# Patient Record
Sex: FEMALE | Race: WHITE | Employment: OTHER | URBAN - METROPOLITAN AREA
[De-identification: names, ages, dates, MRNs, and addresses within clinical notes are randomized per-mention and may not be internally consistent; named-entity substitution may affect disease eponyms.]

---

## 2017-04-03 ENCOUNTER — HOSPITAL ENCOUNTER (OUTPATIENT)
Dept: LAB | Age: 77
Discharge: HOME | End: 2017-04-03
Attending: INTERNAL MEDICINE
Payer: MEDICARE

## 2017-04-03 DIAGNOSIS — E78.5 DYSLIPIDEMIA: Chronic | ICD-10-CM

## 2017-04-03 LAB
AST SERPL W P-5'-P-CCNC: 20 U/L (ref 15–37)
CHOLEST SERPL-MCNC: 184 MG/DL
HDLC SERPL-MCNC: 64 MG/DL (ref 40–60)
HDLC SERPL: 2.9 {RATIO}
LDLC SERPL CALC-MCNC: 84.2 MG/DL
LIPID PROFILE,FLP: ABNORMAL
TRIGL SERPL-MCNC: 179 MG/DL (ref 35–150)
VLDLC SERPL CALC-MCNC: 35.8 MG/DL

## 2017-04-03 PROCEDURE — 84450 TRANSFERASE (AST) (SGOT): CPT | Performed by: INTERNAL MEDICINE

## 2017-04-03 PROCEDURE — 80061 LIPID PANEL: CPT | Performed by: INTERNAL MEDICINE

## 2017-04-03 PROCEDURE — 36415 COLL VENOUS BLD VENIPUNCTURE: CPT | Performed by: INTERNAL MEDICINE

## 2017-12-11 ENCOUNTER — APPOINTMENT (RX ONLY)
Dept: URBAN - METROPOLITAN AREA CLINIC 349 | Facility: CLINIC | Age: 77
Setting detail: DERMATOLOGY
End: 2017-12-11

## 2017-12-11 DIAGNOSIS — Z71.89 OTHER SPECIFIED COUNSELING: ICD-10-CM

## 2017-12-11 DIAGNOSIS — L20.89 OTHER ATOPIC DERMATITIS: ICD-10-CM | Status: STABLE

## 2017-12-11 DIAGNOSIS — L82.1 OTHER SEBORRHEIC KERATOSIS: ICD-10-CM | Status: STABLE

## 2017-12-11 PROBLEM — L20.84 INTRINSIC (ALLERGIC) ECZEMA: Status: ACTIVE | Noted: 2017-12-11

## 2017-12-11 PROBLEM — L85.3 XEROSIS CUTIS: Status: ACTIVE | Noted: 2017-12-11

## 2017-12-11 PROCEDURE — 99213 OFFICE O/P EST LOW 20 MIN: CPT

## 2017-12-11 PROCEDURE — ? TREATMENT REGIMEN

## 2017-12-11 PROCEDURE — ? PRESCRIPTION

## 2017-12-11 PROCEDURE — ? COUNSELING

## 2017-12-11 RX ORDER — TRIAMCINOLONE ACETONIDE 1 MG/G
CREAM TOPICAL
Qty: 1 | Refills: 3 | Status: ERX

## 2017-12-11 ASSESSMENT — LOCATION SIMPLE DESCRIPTION DERM
LOCATION SIMPLE: CHEST
LOCATION SIMPLE: LEFT FOREHEAD
LOCATION SIMPLE: ABDOMEN
LOCATION SIMPLE: RIGHT UPPER BACK
LOCATION SIMPLE: RIGHT FOREARM
LOCATION SIMPLE: LEFT FOREARM

## 2017-12-11 ASSESSMENT — LOCATION DETAILED DESCRIPTION DERM
LOCATION DETAILED: RIGHT SUPERIOR MEDIAL UPPER BACK
LOCATION DETAILED: LEFT PROXIMAL DORSAL FOREARM
LOCATION DETAILED: LEFT INFERIOR FOREHEAD
LOCATION DETAILED: EPIGASTRIC SKIN
LOCATION DETAILED: LEFT VENTRAL DISTAL FOREARM
LOCATION DETAILED: RIGHT VENTRAL DISTAL FOREARM
LOCATION DETAILED: RIGHT PROXIMAL RADIAL DORSAL FOREARM
LOCATION DETAILED: MIDDLE STERNUM

## 2017-12-11 ASSESSMENT — LOCATION ZONE DERM
LOCATION ZONE: TRUNK
LOCATION ZONE: ARM
LOCATION ZONE: FACE

## 2017-12-11 ASSESSMENT — PAIN INTENSITY VAS: HOW INTENSE IS YOUR PAIN 0 BEING NO PAIN, 10 BEING THE MOST SEVERE PAIN POSSIBLE?: 1/10 PAIN

## 2018-04-10 ENCOUNTER — HOSPITAL ENCOUNTER (OUTPATIENT)
Dept: LAB | Age: 78
Discharge: HOME | End: 2018-04-10
Payer: MEDICARE

## 2018-04-10 DIAGNOSIS — I11.9 HYPERTENSIVE HEART DISEASE WITHOUT HEART FAILURE: Chronic | ICD-10-CM

## 2018-04-10 DIAGNOSIS — E78.5 DYSLIPIDEMIA: Chronic | ICD-10-CM

## 2018-04-10 LAB
ALBUMIN SERPL-MCNC: 3.5 G/DL (ref 3.2–4.6)
ALBUMIN/GLOB SERPL: 1 {RATIO}
ALP SERPL-CCNC: 103 U/L (ref 50–136)
ALT SERPL-CCNC: 29 U/L (ref 12–65)
ANION GAP SERPL CALC-SCNC: 5 MMOL/L
AST SERPL-CCNC: 19 U/L (ref 15–37)
BILIRUB DIRECT SERPL-MCNC: <0.1 MG/DL
BILIRUB SERPL-MCNC: 0.4 MG/DL (ref 0.2–1.1)
BUN SERPL-MCNC: 29 MG/DL (ref 8–23)
CALCIUM SERPL-MCNC: 10.8 MG/DL (ref 8.3–10.4)
CHLORIDE SERPL-SCNC: 106 MMOL/L (ref 98–107)
CHOLEST SERPL-MCNC: 152 MG/DL
CO2 SERPL-SCNC: 29 MMOL/L (ref 21–32)
CREAT SERPL-MCNC: 1.5 MG/DL (ref 0.6–1)
GLOBULIN SER CALC-MCNC: 3.5 G/DL
GLUCOSE SERPL-MCNC: 93 MG/DL (ref 65–100)
HDLC SERPL-MCNC: 45 MG/DL (ref 40–60)
HDLC SERPL: 3.4 {RATIO}
LDLC SERPL CALC-MCNC: 79.4 MG/DL
LIPID PROFILE,FLP: ABNORMAL
POTASSIUM SERPL-SCNC: 4.8 MMOL/L (ref 3.5–5.1)
PROT SERPL-MCNC: 7 G/DL (ref 6.3–8.2)
SODIUM SERPL-SCNC: 140 MMOL/L (ref 136–145)
TRIGL SERPL-MCNC: 138 MG/DL (ref 35–150)
VLDLC SERPL CALC-MCNC: 27.6 MG/DL (ref 6–23)

## 2018-04-10 PROCEDURE — 80048 BASIC METABOLIC PNL TOTAL CA: CPT | Performed by: INTERNAL MEDICINE

## 2018-04-10 PROCEDURE — 80076 HEPATIC FUNCTION PANEL: CPT | Performed by: INTERNAL MEDICINE

## 2018-04-10 PROCEDURE — 80061 LIPID PANEL: CPT | Performed by: INTERNAL MEDICINE

## 2018-04-10 PROCEDURE — 36415 COLL VENOUS BLD VENIPUNCTURE: CPT | Performed by: INTERNAL MEDICINE

## 2019-04-05 ENCOUNTER — HOSPITAL ENCOUNTER (OUTPATIENT)
Dept: LAB | Age: 79
Discharge: HOME | End: 2019-04-05
Payer: MEDICARE

## 2019-04-05 DIAGNOSIS — I11.9 HYPERTENSIVE HEART DISEASE, UNSPECIFIED WHETHER HEART FAILURE PRESENT: Chronic | ICD-10-CM

## 2019-04-05 DIAGNOSIS — E78.5 DYSLIPIDEMIA: Chronic | ICD-10-CM

## 2019-04-05 DIAGNOSIS — I49.3 PVC'S (PREMATURE VENTRICULAR CONTRACTIONS): Chronic | ICD-10-CM

## 2019-04-05 LAB
ALBUMIN SERPL-MCNC: 3.5 G/DL (ref 3.2–4.6)
ALBUMIN/GLOB SERPL: 0.9 {RATIO}
ALP SERPL-CCNC: 111 U/L (ref 50–136)
ALT SERPL-CCNC: 31 U/L (ref 12–65)
ANION GAP SERPL CALC-SCNC: 5 MMOL/L
AST SERPL-CCNC: 24 U/L (ref 15–37)
BILIRUB DIRECT SERPL-MCNC: 0.1 MG/DL
BILIRUB SERPL-MCNC: 0.4 MG/DL (ref 0.2–1.1)
BUN SERPL-MCNC: 25 MG/DL (ref 8–23)
CALCIUM SERPL-MCNC: 10.6 MG/DL (ref 8.3–10.4)
CHLORIDE SERPL-SCNC: 102 MMOL/L (ref 98–107)
CHOLEST SERPL-MCNC: 151 MG/DL
CO2 SERPL-SCNC: 29 MMOL/L (ref 21–32)
CREAT SERPL-MCNC: 1.5 MG/DL (ref 0.6–1)
GLOBULIN SER CALC-MCNC: 3.7 G/DL
GLUCOSE SERPL-MCNC: 84 MG/DL (ref 65–100)
HDLC SERPL-MCNC: 50 MG/DL (ref 40–60)
HDLC SERPL: 3 {RATIO}
LDLC SERPL CALC-MCNC: 76.8 MG/DL
LIPID PROFILE,FLP: ABNORMAL
POTASSIUM SERPL-SCNC: 4.8 MMOL/L (ref 3.5–5.1)
PROT SERPL-MCNC: 7.2 G/DL (ref 6.3–8.2)
SODIUM SERPL-SCNC: 136 MMOL/L (ref 136–145)
TRIGL SERPL-MCNC: 121 MG/DL (ref 35–150)
VLDLC SERPL CALC-MCNC: 24.2 MG/DL (ref 6–23)

## 2019-04-05 PROCEDURE — 80076 HEPATIC FUNCTION PANEL: CPT

## 2019-04-05 PROCEDURE — 36415 COLL VENOUS BLD VENIPUNCTURE: CPT

## 2019-04-05 PROCEDURE — 80048 BASIC METABOLIC PNL TOTAL CA: CPT

## 2019-04-05 PROCEDURE — 80061 LIPID PANEL: CPT

## 2020-10-22 ENCOUNTER — HOSPITAL ENCOUNTER (OUTPATIENT)
Dept: LAB | Age: 80
Discharge: HOME | End: 2020-10-22
Payer: MEDICARE

## 2020-10-22 DIAGNOSIS — E78.2 MIXED HYPERLIPIDEMIA: ICD-10-CM

## 2020-10-22 LAB
CHOLEST SERPL-MCNC: 188 MG/DL
HDLC SERPL-MCNC: 59 MG/DL (ref 40–60)
HDLC SERPL: 3.2 {RATIO}
LDLC SERPL CALC-MCNC: 104.8 MG/DL
LIPID PROFILE,FLP: ABNORMAL
TRIGL SERPL-MCNC: 121 MG/DL (ref 35–150)
VLDLC SERPL CALC-MCNC: 24.2 MG/DL (ref 6–23)

## 2020-10-22 PROCEDURE — 36415 COLL VENOUS BLD VENIPUNCTURE: CPT

## 2020-10-22 PROCEDURE — 80061 LIPID PANEL: CPT

## 2022-03-08 ENCOUNTER — HOSPITAL ENCOUNTER (EMERGENCY)
Age: 82
Discharge: HOME | End: 2022-03-08
Attending: EMERGENCY MEDICINE
Payer: MEDICARE

## 2022-03-08 ENCOUNTER — APPOINTMENT (OUTPATIENT)
Dept: CT IMAGING | Age: 82
End: 2022-03-08
Attending: PHYSICIAN ASSISTANT
Payer: MEDICARE

## 2022-03-08 VITALS
BODY MASS INDEX: 20.89 KG/M2 | HEART RATE: 54 BPM | DIASTOLIC BLOOD PRESSURE: 66 MMHG | OXYGEN SATURATION: 96 % | SYSTOLIC BLOOD PRESSURE: 150 MMHG | RESPIRATION RATE: 19 BRPM | HEIGHT: 66 IN | TEMPERATURE: 97.7 F | WEIGHT: 130 LBS

## 2022-03-08 DIAGNOSIS — I10 PRIMARY HYPERTENSION: Primary | ICD-10-CM

## 2022-03-08 LAB
ALBUMIN SERPL-MCNC: 4.1 G/DL (ref 3.2–4.6)
ALBUMIN/GLOB SERPL: 1.2 {RATIO} (ref 1.2–3.5)
ALP SERPL-CCNC: 125 U/L (ref 50–136)
ALT SERPL-CCNC: 28 U/L (ref 12–65)
ANION GAP SERPL CALC-SCNC: 6 MMOL/L (ref 7–16)
AST SERPL-CCNC: 22 U/L (ref 15–37)
BASOPHILS # BLD: 0.1 K/UL (ref 0–0.2)
BASOPHILS NFR BLD: 1 % (ref 0–2)
BILIRUB SERPL-MCNC: 0.6 MG/DL (ref 0.2–1.1)
BUN SERPL-MCNC: 15 MG/DL (ref 8–23)
CALCIUM SERPL-MCNC: 10.8 MG/DL (ref 8.3–10.4)
CHLORIDE SERPL-SCNC: 93 MMOL/L (ref 98–107)
CO2 SERPL-SCNC: 25 MMOL/L (ref 21–32)
CREAT SERPL-MCNC: 1.2 MG/DL (ref 0.6–1)
DIFFERENTIAL METHOD BLD: ABNORMAL
EOSINOPHIL # BLD: 0.1 K/UL (ref 0–0.8)
EOSINOPHIL NFR BLD: 1 % (ref 0.5–7.8)
ERYTHROCYTE [DISTWIDTH] IN BLOOD BY AUTOMATED COUNT: 12.6 % (ref 11.9–14.6)
GLOBULIN SER CALC-MCNC: 3.5 G/DL (ref 2.3–3.5)
GLUCOSE SERPL-MCNC: 103 MG/DL (ref 65–100)
HCT VFR BLD AUTO: 33.9 % (ref 35.8–46.3)
HGB BLD-MCNC: 11.5 G/DL (ref 11.7–15.4)
IMM GRANULOCYTES # BLD AUTO: 0 K/UL (ref 0–0.5)
IMM GRANULOCYTES NFR BLD AUTO: 0 % (ref 0–5)
LYMPHOCYTES # BLD: 1.4 K/UL (ref 0.5–4.6)
LYMPHOCYTES NFR BLD: 17 % (ref 13–44)
MCH RBC QN AUTO: 33.1 PG (ref 26.1–32.9)
MCHC RBC AUTO-ENTMCNC: 33.9 G/DL (ref 31.4–35)
MCV RBC AUTO: 97.7 FL (ref 79.6–97.8)
MONOCYTES # BLD: 0.9 K/UL (ref 0.1–1.3)
MONOCYTES NFR BLD: 11 % (ref 4–12)
NEUTS SEG # BLD: 5.6 K/UL (ref 1.7–8.2)
NEUTS SEG NFR BLD: 70 % (ref 43–78)
NRBC # BLD: 0 K/UL (ref 0–0.2)
PLATELET # BLD AUTO: 324 K/UL (ref 150–450)
PMV BLD AUTO: 10.1 FL (ref 9.4–12.3)
POTASSIUM SERPL-SCNC: 4.4 MMOL/L (ref 3.5–5.1)
PROT SERPL-MCNC: 7.6 G/DL (ref 6.3–8.2)
RBC # BLD AUTO: 3.47 M/UL (ref 4.05–5.2)
SODIUM SERPL-SCNC: 124 MMOL/L (ref 136–145)
WBC # BLD AUTO: 8 K/UL (ref 4.3–11.1)

## 2022-03-08 PROCEDURE — 99284 EMERGENCY DEPT VISIT MOD MDM: CPT

## 2022-03-08 PROCEDURE — 85025 COMPLETE CBC W/AUTO DIFF WBC: CPT

## 2022-03-08 PROCEDURE — 80053 COMPREHEN METABOLIC PANEL: CPT

## 2022-03-08 PROCEDURE — 70450 CT HEAD/BRAIN W/O DYE: CPT

## 2022-03-08 RX ORDER — LABETALOL HYDROCHLORIDE 5 MG/ML
10 INJECTION, SOLUTION INTRAVENOUS
Status: DISCONTINUED | OUTPATIENT
Start: 2022-03-08 | End: 2022-03-09 | Stop reason: HOSPADM

## 2022-03-08 NOTE — ED TRIAGE NOTES
PT arrives via pov for htn that started yesterday, Pt reports slight headache as well. pt states she has been checking her BP frequently and noticed it has been going up and down. Pt has not taken her night dose of bp meds. Pt reports compliant with medications. Pt denies blurred vision or dizziness. Pt ambulates with steady gait. Has appt next Monday with PCP.

## 2022-03-09 NOTE — ED PROVIDER NOTES
81 y.o female presents with bp fluctuations. bp as high as 1790's, usually in 120's. No recent changes to diet, lifestyle, or medicine regimen. Just found out she has ckd, and gfr has been in 30's to 36', for perhaps four years. She incidentally discovered this through my chart. And spoke to her pcp who has made a nephrology referral.  This has been weighing heavily on her mind, as are income taxes - both adding new stress to her life    Denies chest pain and dyspnea, does have some intermittent leg edema  No cva sx, no vision changes, mild headache           Past Medical History:   Diagnosis Date    Anemia     in the past    Arrhythmia     CAD (coronary artery disease)     irregular heart beat    Celiac disease 4/1/14    Chest pain, unspecified 9/1/2011    Dyslipidemia 9/1/2011    Hypertension     Hypertensive cardiovascular disease 9/1/2011    PVC's (premature ventricular contractions) 9/1/2011       Past Surgical History:   Procedure Laterality Date    HX GYN  1/2008    hysterectomy    HX TONSILLECTOMY      NE BREAST SURGERY PROCEDURE UNLISTED      had benign tumor removed 30 years ago         Family History:   Problem Relation Age of Onset    Heart Disease Father     Hypertension Father     Stroke Maternal Grandmother        Social History     Socioeconomic History    Marital status:      Spouse name: Not on file    Number of children: Not on file    Years of education: Not on file    Highest education level: Not on file   Occupational History    Not on file   Tobacco Use    Smoking status: Never Smoker    Smokeless tobacco: Never Used   Substance and Sexual Activity    Alcohol use:  Yes     Alcohol/week: 1.7 standard drinks     Types: 2 Standard drinks or equivalent per week     Comment: occasional glass of wine    Drug use: No    Sexual activity: Not Currently   Other Topics Concern    Not on file   Social History Narrative    Not on file     Social Determinants of Health Financial Resource Strain:     Difficulty of Paying Living Expenses: Not on file   Food Insecurity:     Worried About Running Out of Food in the Last Year: Not on file    Vashti of Food in the Last Year: Not on file   Transportation Needs:     Lack of Transportation (Medical): Not on file    Lack of Transportation (Non-Medical): Not on file   Physical Activity:     Days of Exercise per Week: Not on file    Minutes of Exercise per Session: Not on file   Stress:     Feeling of Stress : Not on file   Social Connections:     Frequency of Communication with Friends and Family: Not on file    Frequency of Social Gatherings with Friends and Family: Not on file    Attends Yarsani Services: Not on file    Active Member of [address] [place] [address] [employer] or Organizations: Not on file    Attends Club or Organization Meetings: Not on file    Marital Status: Not on file   Intimate Partner Violence:     Fear of Current or Ex-Partner: Not on file    Emotionally Abused: Not on file    Physically Abused: Not on file    Sexually Abused: Not on file   Housing Stability:     Unable to Pay for Housing in the Last Year: Not on file    Number of [place] in the Last Year: Not on file    Unstable Housing in the Last Year: Not on file         ALLERGIES: Patient has no known allergies. Review of Systems   Constitutional: Negative for chills and fever. HENT: Negative for rhinorrhea and sore throat. Eyes: Negative for discharge, redness and visual disturbance. Respiratory: Negative for cough and shortness of breath. Cardiovascular: Positive for leg swelling. Negative for chest pain and palpitations. Gastrointestinal: Negative for abdominal pain, diarrhea, nausea and vomiting. Genitourinary: Negative for difficulty urinating and dysuria. Musculoskeletal: Negative for arthralgias and back pain. Skin: Negative for pallor and rash. Neurological: Positive for headaches. Negative for dizziness.    All other systems reviewed and are negative. Vitals:    03/08/22 2100 03/08/22 2130 03/08/22 2200 03/08/22 2226   BP:  (!) 153/64 (!) 145/65 (!) 150/66   Pulse: 62 62 64 (!) 54   Resp: 14 22 15 19   Temp:    97.7 °F (36.5 °C)   SpO2: 98% 97% 91% 96%   Weight:       Height:                Physical Exam  Vitals and nursing note reviewed. Constitutional:       General: She is not in acute distress. Appearance: Normal appearance. She is well-developed. She is not ill-appearing, toxic-appearing or diaphoretic. HENT:      Head: Normocephalic and atraumatic. Right Ear: External ear normal.      Left Ear: External ear normal.      Nose: Nose normal. No rhinorrhea. Eyes:      General: No scleral icterus. Right eye: No discharge. Left eye: No discharge. Extraocular Movements: Extraocular movements intact. Conjunctiva/sclera: Conjunctivae normal.   Cardiovascular:      Rate and Rhythm: Normal rate and regular rhythm. Heart sounds: Normal heart sounds. Pulmonary:      Effort: Pulmonary effort is normal. No respiratory distress. Breath sounds: Normal breath sounds. No wheezing or rales. Abdominal:      General: Abdomen is flat. Tenderness: There is no abdominal tenderness. There is no guarding. Musculoskeletal:         General: Normal range of motion. Cervical back: Normal range of motion and neck supple. Skin:     General: Skin is warm and dry. Findings: No rash. Neurological:      General: No focal deficit present. Mental Status: She is alert and oriented to person, place, and time. Mental status is at baseline. Motor: No abnormal muscle tone.       Comments: cni 2-12 grossly  Nl gait,  Nl speech     Psychiatric:         Mood and Affect: Mood normal.         Behavior: Behavior normal.          MDM  Number of Diagnoses or Management Options  Primary hypertension: established and worsening  Diagnosis management comments: Medical decision making note:  Mild bp fluctuations,  Better after she toook her own dose of p.m. beta blocker  No end-organ damage, gfr actually better  May give a third dose of betablocker in afternoon if needed  F/u  This concludes the \"medical decision making note\" part of this emergency department visit note. Amount and/or Complexity of Data Reviewed  Clinical lab tests: reviewed and ordered (Results Include:    Recent Results (from the past 24 hour(s))  -CBC WITH AUTOMATED DIFF:   Collection Time: 03/08/22  5:36 PM       Result                      Value             Ref Range           WBC                         8.0               4.3 - 11.1 K*       RBC                         3.47 (L)          4.05 - 5.2 M*       HGB                         11.5 (L)          11.7 - 15.4 *       HCT                         33.9 (L)          35.8 - 46.3 %       MCV                         97.7              79.6 - 97.8 *       MCH                         33.1 (H)          26.1 - 32.9 *       MCHC                        33.9              31.4 - 35.0 *       RDW                         12.6              11.9 - 14.6 %       PLATELET                    324               150 - 450 K/*       MPV                         10.1              9.4 - 12.3 FL       ABSOLUTE NRBC               0.00              0.0 - 0.2 K/*       DF                          AUTOMATED                             NEUTROPHILS                 70                43 - 78 %           LYMPHOCYTES                 17                13 - 44 %           MONOCYTES                   11                4.0 - 12.0 %        EOSINOPHILS                 1                 0.5 - 7.8 %         BASOPHILS                   1                 0.0 - 2.0 %         IMMATURE GRANULOCYTES       0                 0.0 - 5.0 %         ABS. NEUTROPHILS            5.6               1.7 - 8.2 K/*       ABS. LYMPHOCYTES            1.4               0.5 - 4.6 K/*       ABS.  MONOCYTES              0.9               0.1 - 1.3 K/* ABS. EOSINOPHILS            0.1               0.0 - 0.8 K/*       ABS. BASOPHILS              0.1               0.0 - 0.2 K/*       ABS. IMM. GRANS.            0.0               0.0 - 0.5 K/*  -METABOLIC PANEL, COMPREHENSIVE:   Collection Time: 03/08/22  5:36 PM       Result                      Value             Ref Range           Sodium                      124 (L)           136 - 145 mm*       Potassium                   4.4               3.5 - 5.1 mm*       Chloride                    93 (L)            98 - 107 mmo*       CO2                         25                21 - 32 mmol*       Anion gap                   6 (L)             7 - 16 mmol/L       Glucose                     103 (H)           65 - 100 mg/*       BUN                         15                8 - 23 MG/DL        Creatinine                  1.20 (H)          0.6 - 1.0 MG*       GFR est AA                  55 (L)            >60 ml/min/1*       GFR est non-AA              46 (L)            >60 ml/min/1*       Calcium                     10.8 (H)          8.3 - 10.4 M*       Bilirubin, total            0.6               0.2 - 1.1 MG*       ALT (SGPT)                  28                12 - 65 U/L         AST (SGOT)                  22                15 - 37 U/L         Alk.  phosphatase            125               50 - 136 U/L        Protein, total              7.6               6.3 - 8.2 g/*       Albumin                     4.1               3.2 - 4.6 g/*       Globulin                    3.5               2.3 - 3.5 g/*       A-G Ratio                   1.2               1.2 - 3.5      )  Tests in the radiology section of CPT®: reviewed and ordered (CT HEAD WO CONT   Final Result    Chronic appearing white matter change without acute intracranial abnormality.   )  Decide to obtain previous medical records or to obtain history from someone other than the patient: yes  Obtain history from someone other than the patient: yes ( and daughter at bedside)  Discuss the patient with other providers: yes (Her cardiologist notified)    Risk of Complications, Morbidity, and/or Mortality  Presenting problems: moderate  Diagnostic procedures: low  Management options: low    Patient Progress  Patient progress: improved         Procedures

## 2022-03-09 NOTE — ED NOTES
Pt states monitors BP at home and noticed BP high sbp 170's off and an since yesterday . mask on,family at bedside

## 2022-03-09 NOTE — DISCHARGE INSTRUCTIONS
Take an extra metoprolol mid day, if pressures remain elevated  Return to the e.r.if worse in any way

## 2022-03-09 NOTE — ED NOTES
I have reviewed discharge instructions with the patient. The patient verbalized understanding. Patient left ED via Discharge Method: ambulatory to Home with daughter ,spouse  Opportunity for questions and clarification provided. Patient given 0 scripts. To continue your aftercare when you leave the hospital, you may receive an automated call from our care team to check in on how you are doing. This is a free service and part of our promise to provide the best care and service to meet your aftercare needs.  If you have questions, or wish to unsubscribe from this service please call [phone]. Thank you for Choosing our [facility] Emergency Department.

## 2022-03-12 ENCOUNTER — HOSPITAL ENCOUNTER (EMERGENCY)
Age: 82
Discharge: HOME | End: 2022-03-13
Attending: EMERGENCY MEDICINE
Payer: MEDICARE

## 2022-03-12 VITALS
HEIGHT: 66 IN | BODY MASS INDEX: 21.21 KG/M2 | SYSTOLIC BLOOD PRESSURE: 151 MMHG | WEIGHT: 132 LBS | HEART RATE: 54 BPM | DIASTOLIC BLOOD PRESSURE: 66 MMHG | RESPIRATION RATE: 22 BRPM | OXYGEN SATURATION: 92 % | TEMPERATURE: 97.3 F

## 2022-03-12 DIAGNOSIS — E86.0 DEHYDRATION: ICD-10-CM

## 2022-03-12 DIAGNOSIS — E87.1 HYPONATREMIA: ICD-10-CM

## 2022-03-12 DIAGNOSIS — R53.1 GENERALIZED WEAKNESS: Primary | ICD-10-CM

## 2022-03-12 LAB
ALBUMIN SERPL-MCNC: 3.9 G/DL (ref 3.2–4.6)
ALBUMIN/GLOB SERPL: 1.1 {RATIO} (ref 1.2–3.5)
ALP SERPL-CCNC: 140 U/L (ref 50–136)
ALT SERPL-CCNC: 35 U/L (ref 12–65)
ANION GAP SERPL CALC-SCNC: 4 MMOL/L (ref 7–16)
AST SERPL-CCNC: 24 U/L (ref 15–37)
BASOPHILS # BLD: 0.1 K/UL (ref 0–0.2)
BASOPHILS NFR BLD: 1 % (ref 0–2)
BILIRUB SERPL-MCNC: 0.4 MG/DL (ref 0.2–1.1)
BUN SERPL-MCNC: 27 MG/DL (ref 8–23)
CALCIUM SERPL-MCNC: 10.2 MG/DL (ref 8.3–10.4)
CHLORIDE SERPL-SCNC: 95 MMOL/L (ref 98–107)
CO2 SERPL-SCNC: 28 MMOL/L (ref 21–32)
CREAT SERPL-MCNC: 1.2 MG/DL (ref 0.6–1)
DIFFERENTIAL METHOD BLD: ABNORMAL
EOSINOPHIL # BLD: 0.1 K/UL (ref 0–0.8)
EOSINOPHIL NFR BLD: 1 % (ref 0.5–7.8)
ERYTHROCYTE [DISTWIDTH] IN BLOOD BY AUTOMATED COUNT: 12.4 % (ref 11.9–14.6)
GLOBULIN SER CALC-MCNC: 3.7 G/DL (ref 2.3–3.5)
GLUCOSE SERPL-MCNC: 108 MG/DL (ref 65–100)
HCT VFR BLD AUTO: 34.4 % (ref 35.8–46.3)
HGB BLD-MCNC: 11.5 G/DL (ref 11.7–15.4)
IMM GRANULOCYTES # BLD AUTO: 0 K/UL (ref 0–0.5)
IMM GRANULOCYTES NFR BLD AUTO: 0 % (ref 0–5)
LIPASE SERPL-CCNC: 273 U/L (ref 73–393)
LYMPHOCYTES # BLD: 1.4 K/UL (ref 0.5–4.6)
LYMPHOCYTES NFR BLD: 14 % (ref 13–44)
MCH RBC QN AUTO: 32.3 PG (ref 26.1–32.9)
MCHC RBC AUTO-ENTMCNC: 33.4 G/DL (ref 31.4–35)
MCV RBC AUTO: 96.6 FL (ref 79.6–97.8)
MONOCYTES # BLD: 1.4 K/UL (ref 0.1–1.3)
MONOCYTES NFR BLD: 15 % (ref 4–12)
NEUTS SEG # BLD: 6.6 K/UL (ref 1.7–8.2)
NEUTS SEG NFR BLD: 69 % (ref 43–78)
NRBC # BLD: 0 K/UL (ref 0–0.2)
PLATELET # BLD AUTO: 335 K/UL (ref 150–450)
PMV BLD AUTO: 9.4 FL (ref 9.4–12.3)
POTASSIUM SERPL-SCNC: 4.6 MMOL/L (ref 3.5–5.1)
PROT SERPL-MCNC: 7.6 G/DL (ref 6.3–8.2)
RBC # BLD AUTO: 3.56 M/UL (ref 4.05–5.2)
SODIUM SERPL-SCNC: 127 MMOL/L (ref 136–145)
WBC # BLD AUTO: 9.6 K/UL (ref 4.3–11.1)

## 2022-03-12 PROCEDURE — 96360 HYDRATION IV INFUSION INIT: CPT

## 2022-03-12 PROCEDURE — 83690 ASSAY OF LIPASE: CPT

## 2022-03-12 PROCEDURE — 93005 ELECTROCARDIOGRAM TRACING: CPT | Performed by: PHYSICIAN ASSISTANT

## 2022-03-12 PROCEDURE — 85025 COMPLETE CBC W/AUTO DIFF WBC: CPT

## 2022-03-12 PROCEDURE — 81003 URINALYSIS AUTO W/O SCOPE: CPT

## 2022-03-12 PROCEDURE — 99284 EMERGENCY DEPT VISIT MOD MDM: CPT

## 2022-03-12 PROCEDURE — 80053 COMPREHEN METABOLIC PANEL: CPT

## 2022-03-12 PROCEDURE — 96361 HYDRATE IV INFUSION ADD-ON: CPT

## 2022-03-13 LAB
ATRIAL RATE: 61 BPM
CALCULATED P AXIS, ECG09: 82 DEGREES
CALCULATED R AXIS, ECG10: 98 DEGREES
CALCULATED T AXIS, ECG11: 44 DEGREES
DIAGNOSIS, 93000: NORMAL
P-R INTERVAL, ECG05: 158 MS
Q-T INTERVAL, ECG07: 408 MS
QRS DURATION, ECG06: 126 MS
QTC CALCULATION (BEZET), ECG08: 410 MS
VENTRICULAR RATE, ECG03: 61 BPM

## 2022-03-13 PROCEDURE — 74011250636 HC RX REV CODE- 250/636: Performed by: EMERGENCY MEDICINE

## 2022-03-13 RX ADMIN — SODIUM CHLORIDE 1000 ML: 900 INJECTION, SOLUTION INTRAVENOUS at 00:50

## 2022-03-13 NOTE — ED NOTES
Pt iss an 82 yo F who presents with family for the c/o generalized weakness and elevated BP reading. She was seen earlier this week for HTN, since then she has been keep close track of her BP throughout the day, this evening around 8pm it was 528 systolic, at the time she felt nauseated. She took her metoprolol and had her family bring her to the ER. Denies headache, chest pain, sob, visual changes. Family concerned that she appears generally weak. Pt afebrile, /84 mmHg, alert and oriented x 3, speech clear, abd soft. Patient evaluated initially in triage. Rapid Medical Evaluation was conducted and necessary orders have been placed. I have performed a medical screening exam.  Care will now be transferred to the provider in the back of the emergency department.   Lucio Lennox, PA 10:57 PM

## 2022-03-13 NOTE — ED NOTES
Pt states she is not ready to come back to triage at this time. Pt would like to wait for her daughter to park the car and come back to the dept.

## 2022-03-13 NOTE — ED TRIAGE NOTES
Pt to ED via POV in a wheelchair mask in place for a possible stroke. Pt FM states pt was seen on Tuesday for HTN. Pt states on and off headaches and nausea today. Pt FM states pt has been more forgetful than usual and states the pt is shaky as well. Pt states her legs feel weak at this time. PA in triage.

## 2022-03-13 NOTE — ED TRIAGE NOTES
Pt states taking her metoprolol this evening. Pt states no visual changes. Pt has no facial deficits, speech clear. NIH 0. Pt also states loss of appetite as well and states \"shakiness\" in her hands.

## 2022-03-13 NOTE — ED PROVIDER NOTES
Per provider in triage note: \"Pt iss an 80 yo F who presents with family for the c/o generalized weakness and elevated BP reading. She was seen earlier this week for HTN, since then she has been keep close track of her BP throughout the day, this evening around 8pm it was 694 systolic, at the time she felt nauseated. She took her metoprolol and had her family bring her to the ER. Denies headache, chest pain, sob, visual changes. Family concerned that she appears generally weak.      Pt afebrile, /84 mmHg, alert and oriented x 3, speech clear, abd soft. \"    The history is provided by the patient, the spouse and a relative. Extremity Weakness   This is a new problem. The current episode started more than 2 days ago. The problem occurs daily. The problem has been gradually worsening. The patient is experiencing no pain. Pertinent negatives include no numbness, full range of motion, no stiffness, no tingling, no itching, no back pain and no neck pain. Exacerbated by: Nothing. She has tried rest for the symptoms. The treatment provided no relief. There has been no history of extremity trauma.         Past Medical History:   Diagnosis Date    Anemia     in the past    Arrhythmia     CAD (coronary artery disease)     irregular heart beat    Celiac disease 4/1/14    Chest pain, unspecified 9/1/2011    Dyslipidemia 9/1/2011    Hypertension     Hypertensive cardiovascular disease 9/1/2011    PVC's (premature ventricular contractions) 9/1/2011       Past Surgical History:   Procedure Laterality Date    HX GYN  1/2008    hysterectomy    HX TONSILLECTOMY      ID BREAST SURGERY PROCEDURE UNLISTED      had benign tumor removed 30 years ago         Family History:   Problem Relation Age of Onset    Heart Disease Father     Hypertension Father     Stroke Maternal Grandmother        Social History     Socioeconomic History    Marital status:      Spouse name: Not on file    Number of children: Not on file    Years of education: Not on file    Highest education level: Not on file   Occupational History    Not on file   Tobacco Use    Smoking status: Never Smoker    Smokeless tobacco: Never Used   Substance and Sexual Activity    Alcohol use: Yes     Alcohol/week: 1.7 standard drinks     Types: 2 Standard drinks or equivalent per week     Comment: occasional glass of wine    Drug use: No    Sexual activity: Not Currently   Other Topics Concern    Not on file   Social History Narrative    Not on file     Social Determinants of Health     Financial Resource Strain:     Difficulty of Paying Living Expenses: Not on file   Food Insecurity:     Worried About Running Out of Food in the Last Year: Not on file    Vashti of Food in the Last Year: Not on file   Transportation Needs:     Lack of Transportation (Medical): Not on file    Lack of Transportation (Non-Medical): Not on file   Physical Activity:     Days of Exercise per Week: Not on file    Minutes of Exercise per Session: Not on file   Stress:     Feeling of Stress : Not on file   Social Connections:     Frequency of Communication with Friends and Family: Not on file    Frequency of Social Gatherings with Friends and Family: Not on file    Attends Jewish Services: Not on file    Active Member of [address] [place] [address] [employer] or Organizations: Not on file    Attends Club or Organization Meetings: Not on file    Marital Status: Not on file   Intimate Partner Violence:     Fear of Current or Ex-Partner: Not on file    Emotionally Abused: Not on file    Physically Abused: Not on file    Sexually Abused: Not on file   Housing Stability:     Unable to Pay for Housing in the Last Year: Not on file    Number of [place] in the Last Year: Not on file    Unstable Housing in the Last Year: Not on file         ALLERGIES: Patient has no known allergies. Review of Systems   Constitutional: Positive for fatigue. Negative for chills and fever.    Musculoskeletal: Negative for back pain, neck pain and stiffness. Skin: Negative for itching. Neurological: Positive for weakness. Negative for tingling and numbness. Psychiatric/Behavioral: Negative for sleep disturbance. The patient is nervous/anxious. All other systems reviewed and are negative. Vitals:    03/12/22 2340 03/12/22 2341 03/12/22 2345 03/12/22 2352   BP:  (!) 148/61 (!) 151/66    Pulse: (!) 56 (!) 54 (!) 54    Resp: 12 14 22    Temp:       SpO2: 95% 96% 92%    Weight:    59.9 kg (132 lb)   Height:    5' 6\" (1.676 m)            Physical Exam  Vitals and nursing note reviewed. Constitutional:       General: She is not in acute distress. Appearance: She is well-developed. HENT:      Head: Normocephalic and atraumatic. Right Ear: External ear normal.      Left Ear: External ear normal.   Eyes:      Conjunctiva/sclera: Conjunctivae normal.      Pupils: Pupils are equal, round, and reactive to light. Cardiovascular:      Rate and Rhythm: Normal rate and regular rhythm. Heart sounds: Normal heart sounds. Pulmonary:      Effort: Pulmonary effort is normal.      Breath sounds: Normal breath sounds. Abdominal:      General: Bowel sounds are normal.      Palpations: Abdomen is soft. Tenderness: There is no abdominal tenderness. Musculoskeletal:         General: Normal range of motion. Cervical back: Normal range of motion and neck supple. Skin:     General: Skin is warm and dry. Capillary Refill: Capillary refill takes less than 2 seconds. Neurological:      Mental Status: She is alert and oriented to person, place, and time. Cranial Nerves: Cranial nerves are intact. Sensory: Sensation is intact. Motor: Motor function is intact. Coordination: Coordination is intact. Psychiatric:         Mood and Affect: Mood is anxious.          Speech: Speech normal.         Cognition and Memory: Cognition and memory normal.          MDM  Number of Diagnoses or Management Options  Dehydration: new and requires workup  Generalized weakness: new and requires workup     Amount and/or Complexity of Data Reviewed  Clinical lab tests: ordered and reviewed  Tests in the medicine section of CPT®: ordered and reviewed (ECG interpretation for ECG dated 3/12/2022 at 11:02 PM: ECG reveals a sinus rhythm at a rate of 61 bpm with occasional premature atrial complexes, nonspecific intraventricular conduction block. Abnormal ECG. Kenji Crowell MD  )  Review and summarize past medical records: yes    Risk of Complications, Morbidity, and/or Mortality  Presenting problems: moderate  Diagnostic procedures: minimal  Management options: moderate    Patient Progress  Patient progress: improved         Procedures        Results Reviewed:      Recent Results (from the past 24 hour(s))   METABOLIC PANEL, COMPREHENSIVE    Collection Time: 03/12/22 10:59 PM   Result Value Ref Range    Sodium 127 (L) 136 - 145 mmol/L    Potassium 4.6 3.5 - 5.1 mmol/L    Chloride 95 (L) 98 - 107 mmol/L    CO2 28 21 - 32 mmol/L    Anion gap 4 (L) 7 - 16 mmol/L    Glucose 108 (H) 65 - 100 mg/dL    BUN 27 (H) 8 - 23 MG/DL    Creatinine 1.20 (H) 0.6 - 1.0 MG/DL    GFR est AA 55 (L) >60 ml/min/1.73m2    GFR est non-AA 46 (L) >60 ml/min/1.73m2    Calcium 10.2 8.3 - 10.4 MG/DL    Bilirubin, total 0.4 0.2 - 1.1 MG/DL    ALT (SGPT) 35 12 - 65 U/L    AST (SGOT) 24 15 - 37 U/L    Alk.  phosphatase 140 (H) 50 - 136 U/L    Protein, total 7.6 6.3 - 8.2 g/dL    Albumin 3.9 3.2 - 4.6 g/dL    Globulin 3.7 (H) 2.3 - 3.5 g/dL    A-G Ratio 1.1 (L) 1.2 - 3.5     CBC WITH AUTOMATED DIFF    Collection Time: 03/12/22 10:59 PM   Result Value Ref Range    WBC 9.6 4.3 - 11.1 K/uL    RBC 3.56 (L) 4.05 - 5.2 M/uL    HGB 11.5 (L) 11.7 - 15.4 g/dL    HCT 34.4 (L) 35.8 - 46.3 %    MCV 96.6 79.6 - 97.8 FL    MCH 32.3 26.1 - 32.9 PG    MCHC 33.4 31.4 - 35.0 g/dL    RDW 12.4 11.9 - 14.6 %    PLATELET 460 494 - 630 K/uL    MPV 9.4 9.4 - 12.3 FL ABSOLUTE NRBC 0.00 0.0 - 0.2 K/uL    DF AUTOMATED      NEUTROPHILS 69 43 - 78 %    LYMPHOCYTES 14 13 - 44 %    MONOCYTES 15 (H) 4.0 - 12.0 %    EOSINOPHILS 1 0.5 - 7.8 %    BASOPHILS 1 0.0 - 2.0 %    IMMATURE GRANULOCYTES 0 0.0 - 5.0 %    ABS. NEUTROPHILS 6.6 1.7 - 8.2 K/UL    ABS. LYMPHOCYTES 1.4 0.5 - 4.6 K/UL    ABS. MONOCYTES 1.4 (H) 0.1 - 1.3 K/UL    ABS. EOSINOPHILS 0.1 0.0 - 0.8 K/UL    ABS. BASOPHILS 0.1 0.0 - 0.2 K/UL    ABS. IMM. GRANS. 0.0 0.0 - 0.5 K/UL   LIPASE    Collection Time: 03/12/22 10:59 PM   Result Value Ref Range    Lipase 273 73 - 393 U/L   EKG, 12 LEAD, INITIAL    Collection Time: 03/12/22 11:02 PM   Result Value Ref Range    Ventricular Rate 61 BPM    Atrial Rate 61 BPM    P-R Interval 158 ms    QRS Duration 126 ms    Q-T Interval 408 ms    QTC Calculation (Bezet) 410 ms    Calculated P Axis 82 degrees    Calculated R Axis 98 degrees    Calculated T Axis 44 degrees    Diagnosis       Sinus rhythm with Premature atrial complexes  Rightward axis  Non-specific intra-ventricular conduction block  Abnormal ECG  When compared with ECG of 31-AUG-2011 11:09,  Premature ventricular complexes are no longer Present  Premature atrial complexes are now Present  QRS duration has increased  T wave inversion now evident in Inferior leads         I discussed the results of all labs, procedures, radiographs, and treatments with the patient and available family. Treatment plan is agreed upon and the patient is ready for discharge. All voiced understanding of the discharge plan and medication instructions or changes as appropriate. Questions about treatment in the ED were answered. All were encouraged to return should symptoms worsen or new problems develop.

## 2022-03-14 LAB
BILIRUB UR QL: NEGATIVE
GLUCOSE UR QL STRIP.AUTO: NEGATIVE MG/DL
KETONES UR-MCNC: NEGATIVE MG/DL
LEUKOCYTE ESTERASE UR QL STRIP: NEGATIVE
NITRITE UR QL: NEGATIVE
PH UR: 7 [PH] (ref 5–9)
PROT UR QL: NEGATIVE MG/DL
RBC # UR STRIP: ABNORMAL /UL
SP GR UR: 1.01 (ref 1–1.02)
UROBILINOGEN UR QL: 0.2 EU/DL (ref 0.2–1)

## 2022-03-18 PROBLEM — E78.2 MIXED HYPERLIPIDEMIA: Status: ACTIVE | Noted: 2020-10-22

## 2022-05-11 ENCOUNTER — TRANSCRIBE ORDER (OUTPATIENT)
Dept: SCHEDULING | Age: 82
End: 2022-05-11

## 2022-05-11 DIAGNOSIS — N18.31 STAGE 3A CHRONIC KIDNEY DISEASE (HCC): Primary | ICD-10-CM

## 2022-05-19 ENCOUNTER — HOSPITAL ENCOUNTER (OUTPATIENT)
Dept: ULTRASOUND IMAGING | Age: 82
Discharge: HOME | End: 2022-05-19
Attending: INTERNAL MEDICINE
Payer: MEDICARE

## 2022-05-19 DIAGNOSIS — N18.31 STAGE 3A CHRONIC KIDNEY DISEASE (HCC): ICD-10-CM

## 2022-05-19 PROCEDURE — 76770 US EXAM ABDO BACK WALL COMP: CPT

## 2022-09-26 ENCOUNTER — HOSPITAL ENCOUNTER (OUTPATIENT)
Dept: ULTRASOUND IMAGING | Age: 82
Discharge: HOME | End: 2022-09-29

## 2022-09-26 DIAGNOSIS — E83.52 HYPERCALCEMIA: ICD-10-CM

## 2022-09-26 DIAGNOSIS — E21.0 HYPERPARATHYROIDISM, PRIMARY (HCC): ICD-10-CM

## 2022-09-29 ENCOUNTER — HOSPITAL ENCOUNTER (OUTPATIENT)
Dept: NUCLEAR MEDICINE | Age: 82
Discharge: HOME | End: 2022-10-02
Payer: MEDICARE

## 2022-09-29 ENCOUNTER — APPOINTMENT (OUTPATIENT)
Dept: NUCLEAR MEDICINE | Age: 82
End: 2022-09-29
Payer: MEDICARE

## 2022-09-29 DIAGNOSIS — E83.52 HYPERCALCEMIA: ICD-10-CM

## 2022-09-29 DIAGNOSIS — E21.0 HYPERPARATHYROIDISM, PRIMARY (HCC): ICD-10-CM

## 2022-09-29 PROCEDURE — 3430000000 HC RX DIAGNOSTIC RADIOPHARMACEUTICAL: Performed by: INTERNAL MEDICINE

## 2022-09-29 PROCEDURE — 78072 PARATHYRD PLANAR W/SPECT&CT: CPT

## 2022-09-29 PROCEDURE — A9500 TC99M SESTAMIBI: HCPCS | Performed by: INTERNAL MEDICINE

## 2022-09-29 RX ADMIN — TETRAKIS(2-METHOXYISOBUTYLISOCYANIDE)COPPER(I) TETRAFLUOROBORATE 21 MILLICURIE: 1 INJECTION, POWDER, LYOPHILIZED, FOR SOLUTION INTRAVENOUS at 10:50

## 2022-10-28 ENCOUNTER — OFFICE VISIT (OUTPATIENT)
Dept: CARDIOLOGY CLINIC | Age: 82
End: 2022-10-28
Payer: MEDICARE

## 2022-10-28 VITALS
WEIGHT: 129.3 LBS | BODY MASS INDEX: 20.78 KG/M2 | HEIGHT: 66 IN | SYSTOLIC BLOOD PRESSURE: 118 MMHG | HEART RATE: 58 BPM | DIASTOLIC BLOOD PRESSURE: 62 MMHG

## 2022-10-28 DIAGNOSIS — I49.3 PVC'S (PREMATURE VENTRICULAR CONTRACTIONS): ICD-10-CM

## 2022-10-28 DIAGNOSIS — I11.9 HYPERTENSIVE HEART DISEASE WITHOUT HEART FAILURE: ICD-10-CM

## 2022-10-28 DIAGNOSIS — I10 ESSENTIAL (PRIMARY) HYPERTENSION: Primary | ICD-10-CM

## 2022-10-28 PROCEDURE — 1123F ACP DISCUSS/DSCN MKR DOCD: CPT | Performed by: INTERNAL MEDICINE

## 2022-10-28 PROCEDURE — 1036F TOBACCO NON-USER: CPT | Performed by: INTERNAL MEDICINE

## 2022-10-28 PROCEDURE — 3074F SYST BP LT 130 MM HG: CPT | Performed by: INTERNAL MEDICINE

## 2022-10-28 PROCEDURE — G8484 FLU IMMUNIZE NO ADMIN: HCPCS | Performed by: INTERNAL MEDICINE

## 2022-10-28 PROCEDURE — 99214 OFFICE O/P EST MOD 30 MIN: CPT | Performed by: INTERNAL MEDICINE

## 2022-10-28 PROCEDURE — G8420 CALC BMI NORM PARAMETERS: HCPCS | Performed by: INTERNAL MEDICINE

## 2022-10-28 PROCEDURE — 1090F PRES/ABSN URINE INCON ASSESS: CPT | Performed by: INTERNAL MEDICINE

## 2022-10-28 PROCEDURE — G8400 PT W/DXA NO RESULTS DOC: HCPCS | Performed by: INTERNAL MEDICINE

## 2022-10-28 PROCEDURE — G8428 CUR MEDS NOT DOCUMENT: HCPCS | Performed by: INTERNAL MEDICINE

## 2022-10-28 PROCEDURE — 3078F DIAST BP <80 MM HG: CPT | Performed by: INTERNAL MEDICINE

## 2022-10-28 RX ORDER — ATORVASTATIN CALCIUM 20 MG/1
20 TABLET, FILM COATED ORAL DAILY
Qty: 90 TABLET | Refills: 3 | Status: SHIPPED | OUTPATIENT
Start: 2022-10-28

## 2022-10-28 RX ORDER — FERROUS SULFATE 137(45) MG
325 TABLET, EXTENDED RELEASE ORAL DAILY
COMMUNITY

## 2022-10-28 RX ORDER — AMLODIPINE BESYLATE 5 MG/1
5 TABLET ORAL DAILY
Qty: 90 TABLET | Refills: 3 | Status: SHIPPED | OUTPATIENT
Start: 2022-10-28

## 2022-10-28 RX ORDER — BENAZEPRIL HYDROCHLORIDE 20 MG/1
20 TABLET ORAL DAILY
Qty: 90 TABLET | Refills: 3 | Status: SHIPPED | OUTPATIENT
Start: 2022-10-28

## 2022-10-28 ASSESSMENT — ENCOUNTER SYMPTOMS
APHONIA: 0
EYE PAIN: 0
STRIDOR: 0
COUGH: 0
ABDOMINAL PAIN: 0
NAIL CHANGES: 0

## 2022-10-28 NOTE — PROGRESS NOTES
[address], [address] [place] [address]  PHONE: [phone]    SUBJECTIVE:   Michaelle Sullivan is a 81 y.o. female 1940   seen for a follow up visit regarding the following:     Chief Complaint   Patient presents with    Hypertension    Irregular Heart Beat    6 Month Follow-Up         History of present illness: 81 y.o. female presented for follow-up 10/28/22  is previous labile blood pressure readings. Now controlled. The patient was formerly seen by Dr. Christy Baron. History of PVCs and hypertension. Previous echocardiogram with mild mitral valve prolapse. Stress perfusion study performed in 2011. Cardiac History:     History of mitral valve prolapse. Echocardiogram 2016, mild mitral prolapse. ECG 02/21/2021 - sinus bradycardia, incomplete right bundle branch block. Assessment and Plan:     Hypertension, controlled. Labs reviewed with mild hyponatremia recently in CKD. Follow-up scheduled with nephrology later this week    Holding off on diuretic therapies  Key CAD CHF Meds            amLODIPine (NORVASC) 5 MG tablet (Taking)    Class: Historical Med    atorvastatin (LIPITOR) 20 MG tablet (Taking)    Class: Historical Med    benazepril (LOTENSIN) 20 MG tablet (Taking)    Class: Historical Med    metoprolol tartrate (LOPRESSOR) 25 MG tablet (Taking)    Class: Historical Med                 Hyperlipidemia. On Atorvastatin 20 mg     PVCs,   controlled. Mitral prolapse. Mild on previous study. CrCl cannot be calculated (Patient's most recent lab result is older than the maximum 180 days allowed. ).            Current Outpatient Medications   Medication Sig    ferrous sulfate (SLOW FE) 142 (45 Fe) MG extended release tablet Take 325 mg by mouth daily    estrogens conjugated (PREMARIN) 0.625 MG/GM CREA vaginal cream Place 1 g vaginally    Multiple Vitamin (MULTI VITAMIN PO) Take by mouth    amLODIPine (NORVASC) 5 MG tablet Take 5 mg by mouth daily    atorvastatin (LIPITOR) 20 MG tablet Take 20 mg by mouth daily    benazepril (LOTENSIN) 20 MG tablet Take 20 mg by mouth daily    cycloSPORINE (RESTASIS) 0.05 % ophthalmic emulsion Apply 1 drop to eye 2 times daily    metoprolol tartrate (LOPRESSOR) 25 MG tablet Take 1 tablet every morning, 1 tablet at lunch, 2 tablets every evening    triamcinolone (ARISTOCORT) 0.5 % cream Apply topically 2 times daily     No current facility-administered medications for this visit. Past Medical History, Past Surgical History, Family history, Social History, and Medications were all reviewed with the patient today and updated as necessary. No Known Allergies  Past Medical History:   Diagnosis Date    Anemia     in the past    Arrhythmia     CAD (coronary artery disease)     irregular heart beat    Celiac disease 4/1/14    Chest pain, unspecified 9/1/2011    Dyslipidemia 9/1/2011    Hypertension     Hypertensive cardiovascular disease 9/1/2011    PVC's (premature ventricular contractions) 9/1/2011     Past Surgical History:   Procedure Laterality Date    BREAST SURGERY      had benign tumor removed 30 years ago    GYN  1/2008    hysterectomy    TONSILLECTOMY       Family History   Problem Relation Age of Onset    Stroke Maternal Grandmother     Hypertension Father     Heart Disease Father       Social History     Tobacco Use    Smoking status: Never    Smokeless tobacco: Never   Substance Use Topics    Alcohol use: Yes     Alcohol/week: 1.7 standard drinks       ROS:    Review of Systems   Constitutional: Negative for fever. HENT:  Negative for stridor. Eyes:  Negative for pain. Cardiovascular:  Negative for chest pain. Respiratory:  Negative for cough. Endocrine: Negative for cold intolerance. Skin:  Negative for nail changes. Musculoskeletal:  Negative for arthritis. Gastrointestinal:  Negative for abdominal pain. Genitourinary:  Negative for dysuria.    Neurological: Negative for aphonia. Psychiatric/Behavioral:  Negative for altered mental status. Allergic/Immunologic: Negative for hives. PHYSICAL EXAM:    /62   Pulse 58   Ht 5' 6\" (1.676 m)   Wt 129 lb 4.8 oz (58.7 kg)   BMI 20.87 kg/m²        Wt Readings from Last 3 Encounters:   10/28/22 129 lb 4.8 oz (58.7 kg)   03/14/22 132 lb (59.9 kg)   10/21/21 132 lb 3.2 oz (60 kg)     BP Readings from Last 3 Encounters:   10/28/22 118/62   03/14/22 (!) 180/76   10/21/21 128/78         Physical Exam  Vitals reviewed. HENT:      Head: Normocephalic. Right Ear: External ear normal.      Left Ear: External ear normal.      Nose: Nose normal.   Eyes:      General: No scleral icterus. Pulmonary:      Effort: Pulmonary effort is normal.   Abdominal:      General: There is no distension. Musculoskeletal:      Cervical back: Neck supple. Skin:     General: Skin is warm. Neurological:      Mental Status: She is alert. Mental status is at baseline. Medical problems and test results were reviewed with the patient today. No results found for this or any previous visit (from the past 672 hour(s)). Lab Results   Component Value Date/Time    CHOL 154 11/01/2021 10:55 AM    HDL 50 11/01/2021 10:55 AM    VLDL 19 11/01/2021 10:55 AM       No results found for any visits on 10/28/22. PLAN  There are no diagnoses linked to this encounter. No follow-ups on file.        Tereza Ramos MD  10/28/2022  10:54 AM

## 2023-03-02 ENCOUNTER — APPOINTMENT (OUTPATIENT)
Dept: GENERAL RADIOLOGY | Age: 83
End: 2023-03-02
Payer: MEDICARE

## 2023-03-02 ENCOUNTER — HOSPITAL ENCOUNTER (EMERGENCY)
Age: 83
Discharge: HOME | End: 2023-03-02
Attending: EMERGENCY MEDICINE
Payer: MEDICARE

## 2023-03-02 VITALS
DIASTOLIC BLOOD PRESSURE: 61 MMHG | WEIGHT: 130 LBS | BODY MASS INDEX: 20.89 KG/M2 | TEMPERATURE: 97.7 F | OXYGEN SATURATION: 95 % | RESPIRATION RATE: 18 BRPM | HEIGHT: 66 IN | SYSTOLIC BLOOD PRESSURE: 129 MMHG | HEART RATE: 69 BPM
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
## 2023-03-02 DIAGNOSIS — M25.552 ACUTE HIP PAIN, LEFT: Primary | ICD-10-CM

## 2023-03-02 PROCEDURE — 73000 X-RAY EXAM OF COLLAR BONE: CPT

## 2023-03-02 PROCEDURE — 99283 EMERGENCY DEPT VISIT LOW MDM: CPT

## 2023-03-02 PROCEDURE — 6370000000 HC RX 637 (ALT 250 FOR IP)

## 2023-03-02 PROCEDURE — 73502 X-RAY EXAM HIP UNI 2-3 VIEWS: CPT

## 2023-03-02 PROCEDURE — 73070 X-RAY EXAM OF ELBOW: CPT

## 2023-03-02 RX ORDER — ACETAMINOPHEN 500 MG
1000 TABLET ORAL
Status: COMPLETED | OUTPATIENT
Start: 2023-03-02 | End: 2023-03-02

## 2023-03-02 RX ADMIN — ACETAMINOPHEN 1000 MG: 500 TABLET, FILM COATED ORAL at 16:30

## 2023-03-02 ASSESSMENT — PAIN DESCRIPTION - DESCRIPTORS: DESCRIPTORS: DULL;STABBING

## 2023-03-02 ASSESSMENT — PAIN SCALES - GENERAL
PAINLEVEL_OUTOF10: 8
PAINLEVEL_OUTOF10: 8

## 2023-03-02 ASSESSMENT — PAIN DESCRIPTION - ORIENTATION
ORIENTATION: LEFT
ORIENTATION: LEFT

## 2023-03-02 ASSESSMENT — ENCOUNTER SYMPTOMS
CHEST TIGHTNESS: 0
SHORTNESS OF BREATH: 0
COLOR CHANGE: 0
ABDOMINAL PAIN: 0
VOMITING: 0
DIARRHEA: 0
NAUSEA: 0

## 2023-03-02 ASSESSMENT — PAIN DESCRIPTION - LOCATION
LOCATION: LEG;HIP
LOCATION: BUTTOCKS;HIP

## 2023-03-02 ASSESSMENT — PAIN - FUNCTIONAL ASSESSMENT: PAIN_FUNCTIONAL_ASSESSMENT: 0-10

## 2023-03-02 NOTE — DISCHARGE INSTRUCTIONS
You were evaluated in the emergency department with left hip pain, left clavicular pain, left elbow pain following a fall.  Your vital signs today were normal.  X-rays did not show any fractures or dislocations.  Likely a contusion.  You are given some Tylenol in the emergency department.  You can continue taking this as needed for pain.    Weightbearing as tolerated.  Please return with any worsening symptoms or concerns.  Otherwise, plan to follow-up with primary care.

## 2023-03-02 NOTE — ED TRIAGE NOTES
Patient with slip and fall at [facility] falling backwards, patient complaining of left hip/buttocks pain, unable to bear weight with EMS. Patient also with left clavicle area pain and left elbow. No loss of consciousness during fall and denies hitting head.

## 2023-03-02 NOTE — ED NOTES
I have reviewed discharge instructions with the patient and daughter.  The patient and daughter verbalized understanding.    Patient left ED via Discharge Method: wheelchair to Home with family.    Opportunity for questions and clarification provided.       Patient given 0 scripts.         To continue your aftercare when you leave the hospital, you may receive an automated call from our care team to check in on how you are doing.  This is a free service and part of our promise to provide the best care and service to meet your aftercare needs.” If you have questions, or wish to unsubscribe from this service please call [phone].  Thank you for Choosing our [facility] Emergency Department.        Clara Rankin RN  03/02/23 4514

## 2023-03-03 NOTE — ED PROVIDER NOTES
Emergency Department Provider Note                   PCP:                Yissel Avelar MD               Age: 81 y.o. Sex: female     DISPOSITION Decision To Discharge 03/02/2023 05:16:27 PM       ICD-10-CM    1. Acute hip pain, left  M25.552       2. Fall, initial encounter  [address] [facility]           MEDICAL DECISION MAKING  Complexity of Problems Addressed:  1 stable acute illness    Data Reviewed and Analyzed:  Category 1:     I ordered each unique test.  I reviewed the results of each unique test.        Category 2:     I independently ordered and reviewed the X-rays. No acute osseous abnormalities    Category 3: Discussion of management or test interpretation. 67-year-old female with no pertinent medical history presenting to the emergency department for evaluation after sustaining a witnessed mechanical fall earlier today. Fell backwards onto hard tile onto her left hip and buttocks from standing position. Primarily complaining of left hip, left elbow, and left clavicle pain. Vitally stable upon arrival.  No head trauma, loss of consciousness, thinner use. Exam with mild left lateral hip tenderness at approximately the greater trochanter. No shortening or rotation. Mild left clavicular pain. Low suspicion for fractures, however, given patient's age, we will obtain plain films of the left hip, clavicle, and elbow. We will treat with oral Tylenol for now. Plain films without any acute abnormalities. Feeling better upon reevaluation. Able to bear weight currently with walker assistance. Does have a walker and available assistance at home. Patient currently stable for discharge. Advised follow-up with primary care. Return precautions discussed. Patient verbalizes understanding and is agreeable to this plan.          Risk of Complications and/or Morbidity of Patient Management:  OTC drug management performed and Patient was discharged risks and benefits of hospitalization were considered Re Mckay is a 81 y.o. female who presents to the Emergency Department with chief complaint of    Chief Complaint   Patient presents with    Fall      Patient is an 42-year-old female with no pertinent medical history presenting to the emergency department after sustaining a witnessed mechanical fall earlier this morning. Patient states she slipped and fell backwards at [facility] onto her left hip and buttocks region. States she has been unable to bear weight. EMS was called. She is also reporting some pain in her left clavicular area and left elbow pain. Denies any head trauma or loss of consciousness. States her pain is about 8 out of 10 in severity. Denies any numbness or paresthesias. No radiation of this pain. She has no other complaints today. The history is provided by the patient. Review of Systems   Constitutional:  Negative for chills, fatigue and fever. Eyes:  Negative for visual disturbance. Respiratory:  Negative for chest tightness and shortness of breath. Cardiovascular:  Negative for chest pain and palpitations. Gastrointestinal:  Negative for abdominal pain, diarrhea, nausea and vomiting. Genitourinary:  Negative for dysuria. Musculoskeletal:  Positive for arthralgias. Negative for myalgias. Skin:  Negative for color change and wound. Neurological:  Negative for dizziness, syncope, weakness, light-headedness and headaches. All other systems reviewed and are negative. Vitals signs and nursing note reviewed. No data found. Physical Exam  Vitals and nursing note reviewed. Constitutional:       General: She is not in acute distress. Appearance: Normal appearance. She is not ill-appearing. HENT:      Head: Normocephalic and atraumatic. Right Ear: External ear normal.      Left Ear: External ear normal.      Nose: Nose normal.   Eyes:      General: No scleral icterus. Right eye: No discharge. Left eye: No discharge. Extraocular Movements: Extraocular movements intact. Conjunctiva/sclera: Conjunctivae normal.      Pupils: Pupils are equal, round, and reactive to light. Cardiovascular:      Rate and Rhythm: Normal rate and regular rhythm. Pulses: Normal pulses. Heart sounds: Normal heart sounds. Pulmonary:      Effort: Pulmonary effort is normal.      Breath sounds: Normal breath sounds. Abdominal:      General: Abdomen is flat. Palpations: Abdomen is soft. Tenderness: There is no abdominal tenderness. Musculoskeletal:         General: Tenderness present. Normal range of motion. Cervical back: Normal range of motion and neck supple. Comments: Tenderness of the lateral left hip approximately at the greater trochanter. Skin:     General: Skin is warm and dry. Neurological:      General: No focal deficit present. Mental Status: She is alert and oriented to person, place, and time.    Psychiatric:         Mood and Affect: Mood normal.         Behavior: Behavior normal.        Procedures          Orders Placed This Encounter   Procedures    XR HIP LEFT (2-3 VIEWS)    XR CLAVICLE LEFT    XR ELBOW LEFT (2 VIEWS)        Medications   acetaminophen (TYLENOL) tablet 1,000 mg (1,000 mg Oral Given 3/2/23 1630)       Discharge Medication List as of 3/2/2023  5:53 PM           Past Medical History:   Diagnosis Date    Anemia     in the past    Arrhythmia     CAD (coronary artery disease)     irregular heart beat    Celiac disease 4/1/14    Chest pain, unspecified 9/1/2011    Dyslipidemia 9/1/2011    Hypertension     Hypertensive cardiovascular disease 9/1/2011    PVC's (premature ventricular contractions) 9/1/2011        Past Surgical History:   Procedure Laterality Date    BREAST SURGERY      had benign tumor removed 30 years ago    GYN  1/2008    hysterectomy    TONSILLECTOMY          Family History   Problem Relation Age of Onset    Stroke Maternal Grandmother     Hypertension Father Heart Disease Father         Social History     Socioeconomic History    Marital status:    Tobacco Use    Smoking status: Never    Smokeless tobacco: Never   Substance and Sexual Activity    Alcohol use: Yes     Alcohol/week: 1.7 standard drinks    Drug use: No        Allergies: Patient has no known allergies. Discharge Medication List as of 3/2/2023  5:53 PM        CONTINUE these medications which have NOT CHANGED    Details   ferrous sulfate (SLOW FE) 142 (45 Fe) MG extended release tablet Take 325 mg by mouth dailyHistorical Med      estrogens conjugated (PREMARIN) 0.625 MG/GM CREA vaginal cream Place 1 g vaginally, Vaginal, Starting Wed 3/18/2020, Historical Med      Multiple Vitamin (MULTI VITAMIN PO) Take by mouthHistorical Med      amLODIPine (NORVASC) 5 MG tablet Take 1 tablet by mouth daily, Disp-90 tablet, R-3Normal      atorvastatin (LIPITOR) 20 MG tablet Take 1 tablet by mouth daily, Disp-90 tablet, R-3Normal      benazepril (LOTENSIN) 20 MG tablet Take 1 tablet by mouth daily, Disp-90 tablet, R-3Normal      metoprolol tartrate (LOPRESSOR) 25 MG tablet Take 1 tablet by mouth 4 times daily Take 1 tablet every morning, 1 tablet at lunch, 2 tablets every evening, Disp-360 tablet, R-3Normal      cycloSPORINE (RESTASIS) 0.05 % ophthalmic emulsion Apply 1 drop to eye 2 times dailyHistorical Med      triamcinolone (ARISTOCORT) 0.5 % cream Apply topically 2 times daily, Topical, 2 TIMES DAILY Starting Fri 7/17/2020, Historical Med              Results for orders placed or performed during the hospital encounter of 03/02/23   XR HIP LEFT (2-3 VIEWS)    Narrative    STUDY: XR HIP LEFT (2-3 VIEWS) [id number]     STUDY DATE: 3/2/2023 3:02 PM     HISTORY: fall     COMPARISON: None available    TECHNIQUE: Frontal radiograph of the pelvis and 2 views of the left hip were  performed. FINDINGS:    ALIGNMENT: No dislocation. BONES:No displaced fracture of the left proximal femur.  Diffuse bone  demineralization. Irregularity of the left superior and inferior pubic rami,  chronic appearing. JOINTS:  Degenerative changes of the bilateral hips and sacroiliac joints. SOFT TISSUES: Pessary device noted. No acute soft tissue abnormality. Impression    1. No displaced left hip fracture. If the patient is unable to weight bear,  consider further evaluation with cross-sectional imaging. 2.  Irregularity of the left superior and inferior pubic rami, favor sequela of  chronic fractures. Superimposed acute fracture would be difficult to exclude. Clinical correlation recommended. Correlation with prior imaging would be of  benefit. XR CLAVICLE LEFT    Narrative    STUDY: XR CLAVICLE LEFT [id number]     STUDY DATE: 3/2/2023 3:02 PM     HISTORY: slip and fall     COMPARISON: None available    TECHNIQUE: 2 views of left clavicle    FINDINGS:    ALIGNMENT: No dislocation. BONES:Diffuse bony demineralization. No displaced fracture. JOINTS:  Mild to moderate degenerative changes of the acromioclavicular and  glenohumeral joints. SOFT TISSUES: No acute soft tissue abnormalities suggested. Impression    No acute osseous abnormality. XR ELBOW LEFT (2 VIEWS)    Narrative    STUDY: XR ELBOW LEFT (2 VIEWS) [id number]     STUDY DATE: 3/2/2023 3:02 PM     HISTORY: slip and fall     COMPARISON: None available. TECHNIQUE: 2 views left elbow. FINDINGS:    ALIGNMENT: No dislocation. BONES:No acute fracture. Diffuse bony demineralization. JOINTS:  Maintained. No elbow joint effusion suggested. SOFT TISSUES: No acute soft tissue abnormality. Impression    No acute osseous abnormality. XR HIP LEFT (2-3 VIEWS)   Final Result      1. No displaced left hip fracture. If the patient is unable to weight bear,   consider further evaluation with cross-sectional imaging. 2.  Irregularity of the left superior and inferior pubic rami, favor sequela of   chronic fractures. Superimposed acute fracture would be difficult to exclude. Clinical correlation recommended. Correlation with prior imaging would be of   benefit. XR CLAVICLE LEFT   Final Result      No acute osseous abnormality. XR ELBOW LEFT (2 VIEWS)   Final Result      No acute osseous abnormality. Voice dictation software was used during the making of this note. This software is not perfect and grammatical and other typographical errors may be present. This note has not been completely proofread for errors.       Keena Sanchezma  03/03/23 1046

## 2023-05-02 ENCOUNTER — OFFICE VISIT (OUTPATIENT)
Dept: ORTHOPEDIC SURGERY | Age: 83
End: 2023-05-02
Payer: MEDICARE

## 2023-05-02 DIAGNOSIS — R26.9 GAIT DISTURBANCE: ICD-10-CM

## 2023-05-02 DIAGNOSIS — M53.3 SACROILIAC JOINT PAIN: Primary | ICD-10-CM

## 2023-05-02 DIAGNOSIS — M54.50 LUMBAR BACK PAIN: ICD-10-CM

## 2023-05-02 PROCEDURE — G8428 CUR MEDS NOT DOCUMENT: HCPCS | Performed by: PHYSICAL MEDICINE & REHABILITATION

## 2023-05-02 PROCEDURE — 1123F ACP DISCUSS/DSCN MKR DOCD: CPT | Performed by: PHYSICAL MEDICINE & REHABILITATION

## 2023-05-02 PROCEDURE — G8420 CALC BMI NORM PARAMETERS: HCPCS | Performed by: PHYSICAL MEDICINE & REHABILITATION

## 2023-05-02 PROCEDURE — 1090F PRES/ABSN URINE INCON ASSESS: CPT | Performed by: PHYSICAL MEDICINE & REHABILITATION

## 2023-05-02 PROCEDURE — 1036F TOBACCO NON-USER: CPT | Performed by: PHYSICAL MEDICINE & REHABILITATION

## 2023-05-02 PROCEDURE — 99203 OFFICE O/P NEW LOW 30 MIN: CPT | Performed by: PHYSICAL MEDICINE & REHABILITATION

## 2023-05-02 PROCEDURE — G8400 PT W/DXA NO RESULTS DOC: HCPCS | Performed by: PHYSICAL MEDICINE & REHABILITATION

## 2023-05-02 RX ORDER — POLYETHYLENE GLYCOL 3350 17 G/17G
POWDER, FOR SOLUTION ORAL
COMMUNITY
Start: 2023-02-01

## 2023-05-02 NOTE — PROGRESS NOTES
Date:  05/02/23     HPI:  I am seeing Jw Ernandez in evalution/folowup. She is having problems with back pain and also balance issues. She was doing pretty well until around 2 months ago when she had a fall and injured her left elbow, clavicle and left buttock. The pain was severe and whereas she was walking without assistance before, she required a rolling walker. She underwent physical therapy and that seemed to help, though she still was using a walker for stability. She was very concerned over having another fall. The left side got better and then around few weeks ago she was trying to get into bed and developed severe pain in the right buttock area. That is the area of interest.  Is gotten somewhat better but is persistent dull pain that is nonradiating. Is worse with standing. Better with walking or lying down. No radiating symptoms and no neurologic issues subjectively in the lower extremities although she does feel her balance is not where it wants to be. She is very scared of falling and is not quite ready to get off of the rolling walker. She has had physical therapy sounds like it is more for the lumbar spine but not necessarily for gait. Has a slight response to Tylenol. No other medications, spine injections, etc.    Recent pelvic imaging reveals age-indeterminate fractures of the left superior pubic ramus/pubic body and mid left inferior pubic ramus as well as the right pubic body. Lumbar spine radiographs reveal degenerative changes.     Past Medical History:   Diagnosis Date    Anemia     in the past    Arrhythmia     CAD (coronary artery disease)     irregular heart beat    Celiac disease 4/1/14    Chest pain, unspecified 9/1/2011    Dyslipidemia 9/1/2011    Hypertension     Hypertensive cardiovascular disease 9/1/2011    PVC's (premature ventricular contractions) 9/1/2011        Past Surgical History:   Procedure Laterality Date    BREAST SURGERY      had benign tumor removed 30

## 2023-10-19 ENCOUNTER — OFFICE VISIT (OUTPATIENT)
Age: 83
End: 2023-10-19
Payer: MEDICARE

## 2023-10-19 VITALS
BODY MASS INDEX: 20.57 KG/M2 | HEART RATE: 58 BPM | SYSTOLIC BLOOD PRESSURE: 140 MMHG | WEIGHT: 128 LBS | HEIGHT: 66 IN | DIASTOLIC BLOOD PRESSURE: 63 MMHG

## 2023-10-19 DIAGNOSIS — I49.3 PVC'S (PREMATURE VENTRICULAR CONTRACTIONS): ICD-10-CM

## 2023-10-19 DIAGNOSIS — E78.5 HYPERLIPIDEMIA, UNSPECIFIED HYPERLIPIDEMIA TYPE: ICD-10-CM

## 2023-10-19 DIAGNOSIS — I10 ESSENTIAL (PRIMARY) HYPERTENSION: Primary | ICD-10-CM

## 2023-10-19 PROCEDURE — 1036F TOBACCO NON-USER: CPT | Performed by: INTERNAL MEDICINE

## 2023-10-19 PROCEDURE — 99214 OFFICE O/P EST MOD 30 MIN: CPT | Performed by: INTERNAL MEDICINE

## 2023-10-19 PROCEDURE — 93000 ELECTROCARDIOGRAM COMPLETE: CPT | Performed by: INTERNAL MEDICINE

## 2023-10-19 PROCEDURE — G8420 CALC BMI NORM PARAMETERS: HCPCS | Performed by: INTERNAL MEDICINE

## 2023-10-19 PROCEDURE — 3078F DIAST BP <80 MM HG: CPT | Performed by: INTERNAL MEDICINE

## 2023-10-19 PROCEDURE — G8484 FLU IMMUNIZE NO ADMIN: HCPCS | Performed by: INTERNAL MEDICINE

## 2023-10-19 PROCEDURE — G8427 DOCREV CUR MEDS BY ELIG CLIN: HCPCS | Performed by: INTERNAL MEDICINE

## 2023-10-19 PROCEDURE — 1123F ACP DISCUSS/DSCN MKR DOCD: CPT | Performed by: INTERNAL MEDICINE

## 2023-10-19 PROCEDURE — 1090F PRES/ABSN URINE INCON ASSESS: CPT | Performed by: INTERNAL MEDICINE

## 2023-10-19 PROCEDURE — G8399 PT W/DXA RESULTS DOCUMENT: HCPCS | Performed by: INTERNAL MEDICINE

## 2023-10-19 PROCEDURE — 3077F SYST BP >= 140 MM HG: CPT | Performed by: INTERNAL MEDICINE

## 2023-10-19 RX ORDER — BENAZEPRIL HYDROCHLORIDE 20 MG/1
20 TABLET ORAL DAILY
Qty: 90 TABLET | Refills: 3 | Status: SHIPPED | OUTPATIENT
Start: 2023-10-19

## 2023-10-19 RX ORDER — WHEAT DEXTRIN 3 G/3.8 G
4 POWDER (GRAM) ORAL
COMMUNITY

## 2023-10-19 RX ORDER — ATORVASTATIN CALCIUM 20 MG/1
20 TABLET, FILM COATED ORAL DAILY
Qty: 90 TABLET | Refills: 3 | Status: SHIPPED | OUTPATIENT
Start: 2023-10-19

## 2023-10-19 RX ORDER — AMLODIPINE BESYLATE 5 MG/1
5 TABLET ORAL DAILY
Qty: 90 TABLET | Refills: 3 | Status: SHIPPED | OUTPATIENT
Start: 2023-10-19

## 2023-10-19 RX ORDER — LANOLIN ALCOHOL/MO/W.PET/CERES
400 CREAM (GRAM) TOPICAL DAILY
COMMUNITY

## 2023-10-19 ASSESSMENT — ENCOUNTER SYMPTOMS
EYE PAIN: 0
STRIDOR: 0
ABDOMINAL PAIN: 0
APHONIA: 0
NAIL CHANGES: 0
COUGH: 0

## 2023-10-19 NOTE — PROGRESS NOTES
times daily Take 1 tablet every morning, 1 tablet at lunch, 2 tablets every evening    polyethylene glycol (GLYCOLAX) 17 GM/SCOOP powder Take by mouth    ferrous sulfate (SLOW FE) 142 (45 Fe) MG extended release tablet Take 45 mg by mouth daily    estrogens conjugated (PREMARIN) 0.625 MG/GM CREA vaginal cream Place 1 g vaginally    cycloSPORINE (RESTASIS) 0.05 % ophthalmic emulsion Apply 1 drop to eye 2 times daily    triamcinolone (ARISTOCORT) 0.5 % cream Apply topically 2 times daily    Multiple Vitamin (MULTI VITAMIN PO) Take by mouth (Patient not taking: Reported on 10/19/2023)     No current facility-administered medications for this visit. Past Medical History, Past Surgical History, Family history, Social History, and Medications were all reviewed with the patient today and updated as necessary. No Known Allergies  Past Medical History:   Diagnosis Date    Anemia     in the past    Arrhythmia     CAD (coronary artery disease)     irregular heart beat    Celiac disease 4/1/14    Chest pain, unspecified 9/1/2011    Dyslipidemia 9/1/2011    Hypertension     Hypertensive cardiovascular disease 9/1/2011    PVC's (premature ventricular contractions) 9/1/2011     Past Surgical History:   Procedure Laterality Date    BREAST SURGERY      had benign tumor removed 30 years ago    GYN  1/2008    hysterectomy    TONSILLECTOMY       Family History   Problem Relation Age of Onset    Stroke Maternal Grandmother     Hypertension Father     Heart Disease Father       Social History     Tobacco Use    Smoking status: Never    Smokeless tobacco: Never   Substance Use Topics    Alcohol use: Yes     Alcohol/week: 1.7 standard drinks of alcohol       ROS:    Review of Systems   Constitutional: Negative for fever. HENT:  Negative for stridor. Eyes:  Negative for pain. Cardiovascular:  Negative for chest pain. Respiratory:  Negative for cough. Endocrine: Negative for cold intolerance.    Skin:  Negative for

## 2024-01-25 ENCOUNTER — TELEPHONE (OUTPATIENT)
Age: 84
End: 2024-01-25

## 2024-10-29 ENCOUNTER — OFFICE VISIT (OUTPATIENT)
Age: 84
End: 2024-10-29

## 2024-10-29 VITALS
DIASTOLIC BLOOD PRESSURE: 60 MMHG | HEIGHT: 66 IN | BODY MASS INDEX: 18 KG/M2 | SYSTOLIC BLOOD PRESSURE: 136 MMHG | HEART RATE: 55 BPM | WEIGHT: 112 LBS

## 2024-10-29 DIAGNOSIS — I45.10 RBBB: ICD-10-CM

## 2024-10-29 DIAGNOSIS — E78.5 HYPERLIPIDEMIA, UNSPECIFIED HYPERLIPIDEMIA TYPE: ICD-10-CM

## 2024-10-29 DIAGNOSIS — I10 ESSENTIAL (PRIMARY) HYPERTENSION: ICD-10-CM

## 2024-10-29 DIAGNOSIS — I49.3 PVC'S (PREMATURE VENTRICULAR CONTRACTIONS): Primary | ICD-10-CM

## 2024-10-29 RX ORDER — BENAZEPRIL HYDROCHLORIDE 20 MG/1
20 TABLET ORAL DAILY
Qty: 90 TABLET | Refills: 3 | Status: SHIPPED | OUTPATIENT
Start: 2024-10-29

## 2024-10-29 RX ORDER — METOPROLOL TARTRATE 25 MG/1
25 TABLET, FILM COATED ORAL 3 TIMES DAILY
Qty: 270 TABLET | Refills: 3 | Status: SHIPPED | OUTPATIENT
Start: 2024-10-29

## 2024-10-29 RX ORDER — AMLODIPINE BESYLATE 5 MG/1
5 TABLET ORAL DAILY
Qty: 90 TABLET | Refills: 3 | Status: SHIPPED | OUTPATIENT
Start: 2024-10-29

## 2024-10-29 RX ORDER — ATORVASTATIN CALCIUM 20 MG/1
20 TABLET, FILM COATED ORAL DAILY
Qty: 90 TABLET | Refills: 3 | Status: SHIPPED | OUTPATIENT
Start: 2024-10-29

## 2024-10-29 NOTE — PROGRESS NOTES
[facility] CARDIOLOGY, [address], [facility] [address]  [place] [address]  PHONE: [phone]    SUBJECTIVE:   Aria Bowers is a 84 y.o. female 1940   seen for a follow up visit regarding the following:     Chief Complaint   Patient presents with    Annual Exam    PVCs    Hypertension    Hyperlipidemia         History of Present Illness  The patient is an 84-year-old female who presents for evaluation of multiple medical concerns.    She reports no significant changes in her exercise tolerance or activity level over the past year. Her current medication regimen includes metoprolol, taken once in the morning, once at lunch, and twice at night. She also requires refills for her amlodipine, benazepril, atorvastatin, and metoprolol prescriptions.        Interval history:   Cardiac History:     History of mitral valve prolapse. Echocardiogram 2016, mild mitral prolapse.      ECG 02/21/2021 - sinus bradycardia, incomplete right bundle branch block.   10/19/2023 Sinus  Bradycardia  - frequent ectopic ventricular beat s # VECs = 2 Right bundle branch block and right axis -possible right ventricular hypertrophy  -consider pulmonary disease.      Results  Testing  EKG shows sinus bradycardia and right bundle branch block.       Assessment & Plan  1. Right bundle branch block.  Her blood pressure is well managed with her current medication regimen. She is taking metoprolol 25 mg, one in the morning, one at lunch, and one in the evening. Refills for amlodipine, benazepril, atorvastatin, and metoprolol have been issued. She is encouraged to maintain her current level of physical activity. If there are any changes in symptoms, she is instructed to contact the office.    2.  Mitral valve prolapse mild prolapse on the previous echocardiogram her murmur is stable on exam today    3.  Hyperlipidemia continue current therapies    Follow-up  Patient is scheduled for a follow-up visit in one year.        Current